# Patient Record
Sex: FEMALE | Race: WHITE | NOT HISPANIC OR LATINO
[De-identification: names, ages, dates, MRNs, and addresses within clinical notes are randomized per-mention and may not be internally consistent; named-entity substitution may affect disease eponyms.]

---

## 2018-06-16 ENCOUNTER — HOSPITAL (OUTPATIENT)
Dept: OTHER | Age: 18
End: 2018-06-16
Attending: EMERGENCY MEDICINE

## 2018-07-27 ENCOUNTER — HOSPITAL (OUTPATIENT)
Dept: OTHER | Age: 18
End: 2018-07-27
Attending: EMERGENCY MEDICINE

## 2018-08-03 ENCOUNTER — HOSPITAL (OUTPATIENT)
Dept: OTHER | Age: 18
End: 2018-08-03
Attending: EMERGENCY MEDICINE

## 2018-10-08 ENCOUNTER — OFFICE VISIT (OUTPATIENT)
Dept: OBGYN CLINIC | Age: 18
End: 2018-10-08

## 2018-10-08 VITALS
DIASTOLIC BLOOD PRESSURE: 65 MMHG | BODY MASS INDEX: 27.29 KG/M2 | HEART RATE: 74 BPM | RESPIRATION RATE: 19 BRPM | HEIGHT: 63 IN | SYSTOLIC BLOOD PRESSURE: 118 MMHG | WEIGHT: 154 LBS

## 2018-10-08 DIAGNOSIS — Z11.3 SCREENING FOR VENEREAL DISEASE: ICD-10-CM

## 2018-10-08 DIAGNOSIS — Z01.419 WELL FEMALE EXAM WITH ROUTINE GYNECOLOGICAL EXAM: Primary | ICD-10-CM

## 2018-10-08 NOTE — PROGRESS NOTES
Stanislav Krishnamurthy is a [de-identified] ,  21 y.o. female [facility] whose Patient's last menstrual period was 10/02/2018 (exact date). was on 10/2/2018 who presents for her annual checkup. She is having no significant problems. She was treated for chlamydia last month and no JUDIE yet. Menstrual status:    Her periods are moderate in flow. She is using three to five pads or tampons per day, usually regular and last 26-30 days. She denies dysmenorrhea. She reports no premenstrual symptoms. Contraception:    The current method of family planning is condoms. She would like to get Nexplanon. She can't remember to take the pill. Sexual history:    She  reports that she currently engages in sexual activity. Medical conditions: This is her first annual exam. She has no significant health history. Pap and Mammogram History:    She has not reached the age for a pap smear. The patient has never had a mammogram.    The patient does not have a family history of breast cancer. Past Medical History:   Diagnosis Date    No known problems      History reviewed. No pertinent surgical history. Allergies: Review of patient's allergies indicates no known allergies. Social History     Social History    Marital status: SINGLE     Spouse name: N/A    Number of children: N/A    Years of education: N/A     Occupational History    Not on file. Social History Main Topics    Smoking status: Never Smoker    Smokeless tobacco: Never Used    Alcohol use No    Drug use: No    Sexual activity: Yes     Other Topics Concern    Not on file     Social History Narrative    No narrative on file     Tobacco History:  reports that she has never smoked. She has never used smokeless tobacco.  Alcohol Abuse:  reports that she does not drink alcohol. Drug Abuse:  reports that she does not use illicit drugs. There is no problem list on file for this patient.       Review of Systems - History obtained from the patient  Constitutional: negative for weight loss, fever, night sweats  HEENT: negative for hearing loss, earache, congestion, snoring, sorethroat  CV: negative for chest pain, palpitations, edema  Resp: negative for cough, shortness of breath, wheezing  GI: negative for change in bowel habits, abdominal pain, black or bloody stools  : negative for frequency, dysuria, hematuria, vaginal discharge  MSK: negative for back pain, joint pain, muscle pain  Breast: negative for breast lumps, nipple discharge, galactorrhea  Skin :negative for itching, rash, hives  Neuro: negative for dizziness, headache, confusion, weakness  Psych: negative for anxiety, depression, change in mood  Heme/lymph: negative for bleeding, bruising, pallor    Physical Exam    Visit Vitals    /65 (BP 1 Location: Left arm, BP Patient Position: Sitting)    Pulse 74    Resp 19    Ht 5' 3\" (1.6 m)    Wt 154 lb (69.9 kg)    LMP 10/02/2018 (Exact Date)    BMI 27.28 kg/m2       Constitutional  · Appearance: well-nourished, well developed, alert, in no acute distress    HENT  · Head and Face: appears normal      Chest  · Respiratory Effort: breathing normal      Skin  · General Inspection: no rash, no lesions identified    Neurologic/Psychiatric  · Mental Status:  · Orientation: grossly oriented to person, place and time  · Mood and Affect: mood normal, affect appropriate    . Assessment:  Routine gynecologic examination  Her current medical status is satisfactory with a recent history or chlamydia  Desires Nexplanon    Plan:  Counseled re: diet, exercise, healthy lifestyle  Return for yearly wellness visits  Gardisil counseling provided  Will check GC and Chlamydia. She was given written info on Nexplanon and will RTO on her menses for placement.

## 2018-10-08 NOTE — MR AVS SNAPSHOT
[facility] [address] [place] [address] [facility] 
[phone] Patient: Kwabena Santoro MRN: [id number] SSM:[birth year] Visit Information Date & Time Provider Department Dept. Phone Encounter #  
 10/8/2018  1:40 PM Lalitha Solares MD [facility] Mackey [phone] [id number] Upcoming Health Maintenance Date Due  
 HPV Age 9Y-34Y (1 of 1 - Female 3 Dose Series) 11/13/2009 Influenza Age 5 to Adult 8/1/2018 Allergies as of 10/8/2018  Review Complete On: 10/8/2018 By: Davide Ji No Known Allergies Current Immunizations  Never Reviewed No immunizations on file. Not reviewed this visit You Were Diagnosed With   
  
 Codes Comments Well female exam with routine gynecological exam    -  Primary ICD-10-CM: W61.223 ICD-9-CM: V72.31 Screening for venereal disease     ICD-10-CM: Z11.3 ICD-9-CM: V74.5 Vitals BP Pulse Resp Height(growth percentile)  
 118/65 (84 %/ 61 %)* (BP 1 Location: Left arm, BP Patient Position: Sitting) 74 19 5' 3\" (1.6 m) (30 %, Z= -0.51) Weight(growth percentile) LMP BMI Smoking Status 154 lb (69.9 kg) (83 %, Z= 0.96) 10/02/2018 (Exact Date) 27.28 kg/m2 (88 %, Z= 1.16) Never Smoker *BP percentiles are based on NHBPEP's 4th Report Growth percentiles are based on CDC 2-20 Years data. BMI and BSA Data Body Mass Index Body Surface Area  
 27.28 kg/m 2 1.76 m 2 Your Updated Medication List  
  
Notice  As of 10/8/2018  2:04 PM  
 You have not been prescribed any medications. Introducing [facility]! [employer] introduces [employer] patient portal. Now you can access parts of your medical record, email your doctor's office, and request medication refills online. 1. In your internet browser, go to [email or web address] 2. Click on the First Time User? Click Here link in the Sign In box.  You will see the New Member Sign Up page. 3. Enter your Daio Access Code exactly as it appears below. You will not need to use this code after youve completed the sign-up process. If you do not sign up before the expiration date, you must request a new code. · Daio Access Code: [email or web address] Expires: 1/6/2019  1:57 PM 
 
4. Enter the last four digits of your Social Security Number (xxxx) and Date of Birth (mm/dd/yyyy) as indicated and click Submit. You will be taken to the next sign-up page. 5. Create a Daio ID. This will be your Daio login ID and cannot be changed, so think of one that is secure and easy to remember. 6. Create a Daio password. You can change your password at any time. 7. Enter your Password Reset Question and Answer. This can be used at a later time if you forget your password. 8. Enter your e-mail address. You will receive e-mail notification when new information is available in [address]. 9. Click Sign Up. You can now view and download portions of your medical record. 10. Click the Download Summary menu link to download a portable copy of your medical information. If you have questions, please visit the Frequently Asked Questions section of the Daio website. Remember, Daio is NOT to be used for urgent needs. For medical emergencies, dial 911. Now available from your iPhone and Android! Please provide this summary of care documentation to your next provider. If you have any questions after today's visit, please call [phone].

## 2018-10-09 LAB
C TRACH RRNA SPEC QL NAA+PROBE: NEGATIVE
N GONORRHOEA RRNA SPEC QL NAA+PROBE: NEGATIVE

## 2018-11-07 ENCOUNTER — OFFICE VISIT (OUTPATIENT)
Dept: OBGYN CLINIC | Age: 18
End: 2018-11-07

## 2018-11-07 DIAGNOSIS — Z30.017 NEXPLANON INSERTION: Primary | ICD-10-CM

## 2018-11-07 DIAGNOSIS — Z32.02 NEGATIVE PREGNANCY TEST: ICD-10-CM

## 2018-11-07 LAB
HCG URINE, QL. (POC): NEGATIVE
VALID INTERNAL CONTROL?: YES

## 2018-11-07 NOTE — PROGRESS NOTES
[facility]  OFFICE PROCEDURE PROGRESS NOTE        Chart reviewed for the following:   Daniel CARPIO, have reviewed the History, Physical and updated the Allergic reactions for [place] performed immediately prior to start of procedure:   Daniel CARPIO, have performed the following reviews on Lelo Domingo prior to the start of the procedure:            * Patient was identified by name and date of birth   * Agreement on procedure being performed was verified  * Risks and Benefits explained to the patient  * Procedure site verified and marked as necessary  * Consent was signed and verified     Time: 3:15pm    Date of procedure: 11/7/2018    Procedure performed by: Markos Carney MD    Provider assisted by: Daniel Garcia MA    Patient assisted by: self    How tolerated by patient: tolerated the procedure well with no complications    Post Procedural Pain Scale: 0 - No Hurt    Comments: none      Lelo Domingo is a [de-identified] ,  21 y.o. female [facility] whose No LMP recorded. was on  presents for office insertion of an [address] sub-dermal contraceptive implant. She has had an opportunity to read the [address] \"Patient Labeling and Consent Form\". We counseled Judahbruce KellyJessica about the insertion and removal procedures as well as potential side-effects, benefits and risks. She state she had no further questions and signed the consent form. She has been using none to the present time. She confirmed that she has no allergies to Betadine or Xylocaine. She reclined on the examination table in the supine position with her left arm flexed at the elbow and externally rotated. The insertion site was identified and marked approximately 6-8 cm proximal to the elbow crease at the inner side of the upper arm overlying the groove between the biceps and triceps muscles. A second tonny was placed 6-8 cm above the first tonny. The insertion site was cleansed with Betadine antiseptic. Approximately 5 ml of 2% xylocaine were injected just under the skin along the planned insertion canal. The NEXPLANON sterile applicator was carefully removed from its blister pack and kept sterile. I removed the needle cap. I visually verified the presence of NEXPLANON inside the needle tip. The skin at the insertion site was then stretched by my thumb and index finger. I then inserted the needle tip through the skin at the appropriate angle to the skin surface, just until the skin has been penetrated. The needle was gently inserted to its full length. The slider was then pushed down and then moved back until it stopped. I then removed the needle and palpated the implant in the appropriate location. The patient also palpated the implant in place. Both Patricia Dominguez and I were able to confirm the presence of the Marina Stagger in its subdermal location by palpation. I placed a small adhesive bandage over the insertion site then wrapped a pressure bandage with sterile gauze. The patient User Card and Patient Chart Label were filled in. She was given the User Card for her records after explaining it to her in detail. I stressed to her that she must have the Marina Stagger removed before three years from today's date. She was then given the Personal Calendar (Bleeding Diary) with instructions in it's use. The patient received Nexplanon lot number [id number].

## 2019-04-17 ENCOUNTER — OFFICE VISIT (OUTPATIENT)
Dept: CARDIOLOGY CLINIC | Age: 19
End: 2019-04-17

## 2019-04-17 VITALS
HEART RATE: 80 BPM | OXYGEN SATURATION: 98 % | BODY MASS INDEX: 26.05 KG/M2 | WEIGHT: 147 LBS | RESPIRATION RATE: 14 BRPM | SYSTOLIC BLOOD PRESSURE: 100 MMHG | DIASTOLIC BLOOD PRESSURE: 70 MMHG | HEIGHT: 63 IN

## 2019-04-17 DIAGNOSIS — R55 SYNCOPE AND COLLAPSE: Primary | ICD-10-CM

## 2019-04-17 DIAGNOSIS — R00.2 PALPITATION: ICD-10-CM

## 2019-04-17 NOTE — PROGRESS NOTES
[place]: Aiyana Knapp  [phone]    History of Present Illness:   Ms. Cesilia Townsend is a 20 yo F referred by Dr. Roberta Santana for cardiac evaluation. She is here due to episodes of syncope. Her last spell happened about a month ago. At the time, she was smoking weed and felt nauseated and passed out. She notes that she has had this happen probably six times in the past.  Usually, she will have about a minute warning and she may feel \"cold sweats\" or shivering followed by \"tunnel vision\". After she passes out, if she gets up right away, she will have further passing out spells. She usually tries to lie down for at least ten minutes and eventually this sensation abates. There are occasions where she feels her heart go fast.  There is rarely an occasion where she may feel slight chest discomfort. She initially thought it might be due to hydration issues and she has been hydrating herself better. She says this is inconsistent and she may go several months without any symptoms. There were no particular triggers with her most recent spell and she says that most of the time they have not been associated with marijuana use. She is compensated on exam with clear lungs and no lower extremity edema. Her blood pressure is 100/70 and heart rate of 80. Her EKG I reviewed personally from her primary care physician from 04/09/2019 and this was normal sinus rhythm, nonspecific ST-T wave abnormality, heart rate of 61. Soc hx. E cigarettes. Fam hx. No early CAD. Assessment and Plan:  Syncope. Unclear etiology. No clear cardiac contribution and will obtain a 24 hour Holter and echocardiogram for further evaluation. We did talk about possible tilt table test and will review her case with Dr. Braeden Neville to see if this might be helpful if her other tests are unrevealing. She  has a past medical history of No known problems. All other systems negative except as above.      PE  Vitals:    04/17/19 1433   BP: 100/70 Pulse: 80   Resp: 14   SpO2: 98%   Weight: 147 lb (66.7 kg)   Height: 5' 3\" (1.6 m)    Body mass index is 26.04 kg/m². General appearance - alert, well appearing, and in no distress  Mental status - affect appropriate to mood  Eyes - sclera anicteric, moist mucous membranes  Neck - supple, no JVD  Chest - clear to auscultation, no wheezes, rales or rhonchi  Heart - normal rate, regular rhythm, normal S1, S2, no murmurs, rubs, clicks or gallops  Abdomen - soft, nontender, nondistended, no masses or organomegaly  Neurological -no focal deficit  Extremities - peripheral pulses normal, no pedal edema      Recent Labs:  No results found for: CHOL, CHOLX, CHLST, CHOLV, [id number], HDL, LDL, LDLC, DLDLP, TGLX, TRIGL, TRIGP, CHHD, CHHDX  No results found for: ABBE, CREAPOC, [facility], CREA, REFC3, REFC4  No results found for: BUN, BUNPOC, IBUN, MBUNV, BUNV  No results found for: K, KI, PLK, WBK  No results found for: HBA1C, HGBE8, VTS6EVXB, MGZ3ZVTL  No results found for: HGBPOC, HGB, HGBP, HGBEXT, HGBEXT  No results found for: PLT, PLTEXT, PLTEXT    Reviewed:  Past Medical History:   Diagnosis Date    No known problems      Social History     Tobacco Use   Smoking Status Never Smoker   Smokeless Tobacco Never Used     Social History     Substance and Sexual Activity   Alcohol Use No     No Known Allergies    Current Outpatient Medications   Medication Sig    etonogestrel (NEXPLANON) 68 mg impl by SubDERmal route. No current facility-administered medications for this visit.         Paris Wright MD  [facility] heart and Vascular [place]  [address] [facility] [address]  [address]

## 2019-04-30 ENCOUNTER — TELEPHONE (OUTPATIENT)
Dept: CARDIOLOGY CLINIC | Age: 19
End: 2019-04-30

## 2019-04-30 NOTE — TELEPHONE ENCOUNTER
2 pt identifiers used  appt scheduled for 5/15/19 @ 8:00am      ----- Message from Namrata Nash MD sent at 4/29/2019  5:12 PM EDT -----  Please call pt today if possible (or first thing tomorrow AM). She wants to reschedule the echo she missed.  thx

## 2019-05-15 ENCOUNTER — TELEPHONE (OUTPATIENT)
Dept: CARDIOLOGY CLINIC | Age: 19
End: 2019-05-15

## 2019-05-15 NOTE — TELEPHONE ENCOUNTER
2 pt identifiers used  Pt. Informed of echo results and verbalized her understanding    ----- Message from Mainor Bose MD sent at 5/15/2019 10:10 AM EDT -----  Please let pt know echo was normal.  Still waiting on holter results.  thx  ----- Message -----  From: Noelle Rosenberg MD  Sent: 5/15/2019  10:08 AM  To: Mainor Bose MD

## 2019-05-20 ENCOUNTER — CLINICAL SUPPORT (OUTPATIENT)
Dept: CARDIOLOGY CLINIC | Age: 19
End: 2019-05-20

## 2019-05-20 DIAGNOSIS — R55 SYNCOPE AND COLLAPSE: ICD-10-CM

## 2019-05-20 DIAGNOSIS — R00.2 PALPITATION: ICD-10-CM

## 2019-05-21 ENCOUNTER — OFFICE VISIT (OUTPATIENT)
Dept: NEUROLOGY | Age: 19
End: 2019-05-21

## 2019-05-21 VITALS
OXYGEN SATURATION: 98 % | RESPIRATION RATE: 18 BRPM | HEART RATE: 76 BPM | SYSTOLIC BLOOD PRESSURE: 120 MMHG | BODY MASS INDEX: 25.33 KG/M2 | WEIGHT: 143 LBS | DIASTOLIC BLOOD PRESSURE: 60 MMHG

## 2019-05-21 DIAGNOSIS — R55 SYNCOPE, UNSPECIFIED SYNCOPE TYPE: Primary | ICD-10-CM

## 2019-05-21 DIAGNOSIS — F32.A DEPRESSION, UNSPECIFIED DEPRESSION TYPE: ICD-10-CM

## 2019-05-21 NOTE — PROGRESS NOTES
[facility] PATIENT EVALUATION/CONSULTATION       PATIENT NAME: Doug Kelley    MRN: [id number]    REASON FOR CONSULTATION: Syncope    05/21/19      Previous records (physician notes, laboratory reports, and radiology reports) and imaging studies were reviewed and summarized. My recommendations will be communicated back to the patient's physician(s) via electronic medical record and/or by [address] [facility] mail. HISTORY OF PRESENT ILLNESS:  Doug Kelley is a 19 y.o. left handed female presenting for evaluation of syncope. Episodes started at age 13 with increasing frequency in 1056-5841. She typically feels \"off\" prior to episodes coming on. She feels slightly nauseated, followed by dizziness/lightheadedeness, blurred vision, SOB, dull chest pain, palpitations, diaphoresis as well as tunneling of vision prior to LOC. Duration of LOC is <1 minute. No associated convulsions or seizure activity, bowel/bladder incontinence, tongue biting. No post event confusion but reports some fatigue. Currently frequency of events is once monthly on average with last episode occurring 2 months ago. Triggers include marijuana use/smoking or fear. No h/o seizure activity, FHx seizures, prior head injuries, CNS infections, febrile seizures. She has seen cardiology with recent holter monitor- results pending. TTE/EKG also completed and normal.         PAST MEDICAL HISTORY:  Past Medical History:   Diagnosis Date    No known problems        PAST SURGICAL HISTORY:  History reviewed. No pertinent surgical history.     FAMILY HISTORY:   Family History   Problem Relation Age of Onset    No Known Problems Mother     No Known Problems Father          SOCIAL HISTORY:  Social History     Socioeconomic History    Marital status: SINGLE     Spouse name: Not on file    Number of children: Not on file    Years of education: Not on file    Highest education level: Not on file   Tobacco Use    Smoking status: Never Smoker    Smokeless tobacco: Never Used   Substance and Sexual Activity    Alcohol use: No    Drug use: No    Sexual activity: Yes   +Marijuana use   +EtOH once monthly on average      MEDICATIONS:   Current Outpatient Medications   Medication Sig Dispense Refill    etonogestrel (NEXPLANON) 68 mg impl by SubDERmal route. ALLERGIES:  No Known Allergies      REVIEW OF SYSTEMS:  10 point ROS reviewed with patient. Please see scanned document under media. PHYSICAL EXAM:  Vital Signs:   Visit Vitals  /60   Pulse 76   Resp 18   Wt 64.9 kg (143 lb)   SpO2 98%   BMI 25.33 kg/m²        General Medical Exam:  General:  Well appearing, comfortable, in no apparent distress. Eyes/ENT: see cranial nerve examination. Neck: No masses appreciated. Full range of motion without tenderness. Respiratory:  Clear to auscultation, good air entry bilaterally. Cardiac:  Regular rate and rhythm, no murmur. GI:  Soft, non-tender, non-distended abdomen. Bowel sounds normal. No masses, organomegaly. Extremities:  No deformities, edema, or skin discoloration. Skin:  No rashes or lesions. Neurological:  · Mental Status:  Alert and oriented to person, place, and time with fluent speech. · Cranial Nerves:   CNII/III/IV/VI: visual fields full to confrontation, EOMI, PERRL, no ptosis or nystagmus. CN V: Facial sensation intact bilaterally, masseter 5/5   CN VII: Facial muscles symmetric and strong   CN VIII: Hears finger rub well bilaterally, intact vestibular function   CN IX/X: Normal palatal movement   CN XI: Full strength shoulder shrug bilaterally   CN XII: Tongue protrusion full and midline without fasciculation or atrophy  · Motor: Normal tone and muscle bulk with no pronator drift.    Individual muscle group testing:  Shoulder abduction:   Left:5/5   Right : 5/5    Shoulder adduction:   Left:5/5   Right : 5/5    Elbow flexion:      Left:5/5   Right : 5/5  Elbow extension:    Left:5/5   Right : 5/5 Wrist flexion:    Left:5/5   Right : 5/5  Wrist extension:    Left:5/5   Right : 5/5  Arm pronation:   Left:5/5   Right : 5/5  Arm supination:   Left:5/5   Right : 5/5    Finger flexion:    Left:5/5   Right : 5/5    Finger extension:   Left:5/5   Right : 5/5   Finger abduction:  Left:5/5   Right : 5/5   Finger adduction:   Left:5/5   Right : 5/5  Hip flexion:     Left:5/5   Right : 5/5         Hip extension:   Left:5/5   Right : 5/5    Knee flexion:    Left:5/5   Right : 5/5    Knee extension:   Left:5/5   Right : 5/5    Dorsiflexion:     Left:5/5   Right : 5/5  Plantar flexion:    Left:5/5   Right : 5/5      · MSRs: No crossed adductors or clonus. RIGHT  LEFT   Brachioradialis 2+ 2+   Biceps 2+ 2+   Triceps 2+ 2+   Knee 2+ 2+   Achilles 2+ 2+        Plantar response Downward Downward          · Sensation: Normal and symmetric perception of pinprick, temperature, light touch, proprioception, and vibration; (-) Romberg. · Coordination: No dysmetria. Normal rapid alternating movements; finger-to-nose and heel-to- shin testing are within normal limits. · Gait: Normal native gait      ASSESSMENT:      ICD-10-CM ICD-9-CM    1. Syncope, unspecified syncope type R55 780.2    2. Depression, unspecified depression type F32.9 36 REFERRAL TO PSYCHIATRY   19year old female presenting with recurrent syncopal episodes since age 13 with recent increasing frequency over the past year. Episodes are typically provoked by fear in the past and/or marijuana use. No accompanying convulsions or post ictal phase suggestive of seizures but will obtain baseline EEG to assess for epileptiform activity. Cardiology evaluations have been unrevealing thus far, although extended cardiac monitoring results are pending. Neurological examination is non-focal today. Description of events sounds most consistent with vasovagal syncope. We discussed avoidance of triggers and cessation of marijuana/substance use.   She requests referral to Psychiatry today to address underlying depression which was provided. Patient was instructed to call for results of her EEG and return for repeat clinical assessment if any new/worsening symptoms. PLAN:  · Baseline EEG- patient to call for results after testing is completed  · Referral to Psychiatry for depression     Follow-up and Dispositions    · Return if symptoms worsen or fail to improve. I have discussed the diagnosis with the patient and the intended plan as seen in the above orders. Patient is in agreement. The patient has received an after-visit summary and questions were answered concerning future plans. Radha Carvalho DO  Staff Neurologist  Diplomate, [facility] [employer] of Psychiatry & Neurology

## 2019-05-22 ENCOUNTER — DOCUMENTATION ONLY (OUTPATIENT)
Dept: NEUROLOGY | Age: 19
End: 2019-05-22

## 2019-05-28 ENCOUNTER — DOCUMENTATION ONLY (OUTPATIENT)
Dept: CARDIOLOGY CLINIC | Age: 19
End: 2019-05-28

## 2019-05-28 NOTE — PROGRESS NOTES
holter shows occasional junctional rhythm and idioventricular rhythm  Future Appointments   Date Time Provider Eladio So   6/5/2019  9:30 AM EEG TECH 2 [facility]

## 2019-05-29 ENCOUNTER — TELEPHONE (OUTPATIENT)
Dept: CARDIOLOGY CLINIC | Age: 19
End: 2019-05-29

## 2019-05-29 NOTE — PROGRESS NOTES
Returned call to patient. Informed patient of test results. Pt verbalized understanding and was scheduled for an appointment with Dr. Ghada Balderas.      Future Appointments   Date Time Provider Eladio Saray   6/5/2019  9:30 AM EEG TECH 2 [address]   7/9/2019  2:40 PM Judith Wallace  [address] [place] [address]

## 2019-06-05 ENCOUNTER — APPOINTMENT (OUTPATIENT)
Dept: NEUROLOGY | Age: 19
End: 2019-06-05
Attending: PSYCHIATRY & NEUROLOGY

## 2019-06-24 ENCOUNTER — TELEPHONE (OUTPATIENT)
Dept: NEUROLOGY | Age: 19
End: 2019-06-24

## 2019-06-24 NOTE — TELEPHONE ENCOUNTER
* Message from Caron below:        ----- Message from Sabrina Choi sent at 6/24/2019 12:31 PM EDT -----  Regarding: Dr Stanley De La Rosa is calling to see what time her Sleep Study Test is for tomorrow 6-, and what she needs to do, please call pt at [phone]

## 2019-06-25 ENCOUNTER — HOSPITAL ENCOUNTER (OUTPATIENT)
Dept: NEUROLOGY | Age: 19
Discharge: HOME | End: 2019-06-25
Attending: PSYCHIATRY & NEUROLOGY
Payer: OTHER GOVERNMENT

## 2019-06-25 DIAGNOSIS — R55 SYNCOPE, UNSPECIFIED SYNCOPE TYPE: ICD-10-CM

## 2019-06-25 PROCEDURE — 95819 EEG AWAKE AND ASLEEP: CPT

## 2019-06-27 ENCOUNTER — HOSPITAL ENCOUNTER (EMERGENCY)
Age: 19
Discharge: HOME | End: 2019-06-28
Attending: EMERGENCY MEDICINE
Payer: OTHER GOVERNMENT

## 2019-06-27 DIAGNOSIS — B00.9 HERPES INFECTION: Primary | ICD-10-CM

## 2019-06-27 PROCEDURE — 99284 EMERGENCY DEPT VISIT MOD MDM: CPT

## 2019-06-27 NOTE — LETTER
[address] 
[address] [facility] DEPT 
[address] [place], [place] [address] [id number] 
[phone] Work/School Note Date: 6/27/2019 To Whom It May concern: 
 
Wanda Alegre was seen and treated today in the emergency room by the following Dr. Dylon Andrew. Wanda Alegre Sincerely, 
 
 
 
 
Rosalinda Pearce RN

## 2019-06-28 VITALS
TEMPERATURE: 98.7 F | RESPIRATION RATE: 18 BRPM | BODY MASS INDEX: 25.19 KG/M2 | WEIGHT: 142.2 LBS | DIASTOLIC BLOOD PRESSURE: 68 MMHG | SYSTOLIC BLOOD PRESSURE: 108 MMHG | HEART RATE: 82 BPM | OXYGEN SATURATION: 98 %

## 2019-06-28 LAB — HCG UR QL: NEGATIVE

## 2019-06-28 PROCEDURE — 81025 URINE PREGNANCY TEST: CPT

## 2019-06-28 PROCEDURE — 74011000250 HC RX REV CODE- 250: Performed by: EMERGENCY MEDICINE

## 2019-06-28 PROCEDURE — 87529 HSV DNA AMP PROBE: CPT

## 2019-06-28 PROCEDURE — 74011250637 HC RX REV CODE- 250/637: Performed by: EMERGENCY MEDICINE

## 2019-06-28 RX ORDER — IBUPROFEN 600 MG/1
600 TABLET ORAL
Status: COMPLETED | OUTPATIENT
Start: 2019-06-28 | End: 2019-06-28

## 2019-06-28 RX ORDER — LIDOCAINE HYDROCHLORIDE 20 MG/ML
JELLY TOPICAL
Status: DISCONTINUED | OUTPATIENT
Start: 2019-06-28 | End: 2019-06-28

## 2019-06-28 RX ORDER — ACYCLOVIR 800 MG/1
800 TABLET ORAL
Status: COMPLETED | OUTPATIENT
Start: 2019-06-28 | End: 2019-06-28

## 2019-06-28 RX ORDER — LIDOCAINE HYDROCHLORIDE 20 MG/ML
SOLUTION OROPHARYNGEAL
Status: DISCONTINUED | OUTPATIENT
Start: 2019-06-28 | End: 2019-06-28

## 2019-06-28 RX ORDER — LIDOCAINE HYDROCHLORIDE 20 MG/ML
JELLY TOPICAL
Status: COMPLETED | OUTPATIENT
Start: 2019-06-28 | End: 2019-06-28

## 2019-06-28 RX ORDER — LIDOCAINE HYDROCHLORIDE 20 MG/ML
JELLY TOPICAL
Qty: 100 ML | Refills: 0 | Status: ON HOLD | OUTPATIENT
Start: 2019-06-28 | End: 2019-08-09

## 2019-06-28 RX ORDER — ACYCLOVIR 800 MG/1
800 TABLET ORAL
Qty: 35 TAB | Refills: 0 | Status: SHIPPED | OUTPATIENT
Start: 2019-06-28 | End: 2019-07-01

## 2019-06-28 RX ADMIN — IBUPROFEN 600 MG: 600 TABLET, FILM COATED ORAL at 00:45

## 2019-06-28 RX ADMIN — LIDOCAINE HYDROCHLORIDE: 20 JELLY TOPICAL at 00:46

## 2019-06-28 RX ADMIN — ACYCLOVIR 800 MG: 800 TABLET ORAL at 00:58

## 2019-06-28 NOTE — DISCHARGE INSTRUCTIONS
Patient Education        We sent off a test to see if this is primary HSV1( oral herpes) or HSV2( genital herpes) . This will come back in 3-4 days and help you understand the risk of recurrence. Genital Herpes in Teens: Care Instructions  Your Care Instructions  Genital herpes is caused by a virus called herpes simplex. There are two types of this virus. Type 2 is the type that usually causes genital herpes. But type 1 can also cause it. Type 1 is the type that causes cold sores. Genital herpes is a sexually transmitted infection (STI). The most common way to get it is through sexual or other contact with someone who has herpes. For example, the virus can spread from a sore in the genital area to the lips. And it can spread from a cold sore on the lips to the genital area. Some people are surprised to find out that they have herpes or that they gave it to someone else. This is because a lot of people who have it don't know that they have it. They may not get sores or they may have sores that they cannot see. But the virus can still be spread by a person who does not have obvious sores or symptoms. There is no cure for herpes, but antiviral medicine can help you feel better and help prevent more outbreaks. This medicine may also lower the chance of spreading the virus. Finding out that you have genital herpes can cause a wide range of emotions. You may feel angry or upset. You may also feel bad about yourself or about sex. Counseling or a support group may help you feel better. And as you get older, you may not get as many outbreaks. The sores may also heal faster and not hurt as much. Follow-up care is a key part of your treatment and safety. Be sure to make and go to all appointments, and call your doctor if you are having problems. It's also a good idea to know your test results and keep a list of the medicines you take. How can you care for yourself at home? · Be safe with medicines.  If your doctor prescribes medicine, take it exactly as prescribed. Call your doctor if you think you are having a problem with your medicine. You will get more details on the specific medicines your doctor prescribes. · To reduce the pain and itching from herpes sores:  ? Wash the area with water 3 or 4 times a day. ? Keep the sores clean and dry in between baths or showers. You may want to let the sores air dry. This may feel better than a towel. ? Wear cotton underwear. Cotton absorbs moisture well. ? Take an over-the-counter pain medicine, such as acetaminophen (Tylenol), ibuprofen (Advil, Motrin), or naproxen (Aleve). Read and follow all instructions on the label. Do not take two or more pain medicines at the same time unless the doctor told you to. Many pain medicines have acetaminophen, which is Tylenol. Too much acetaminophen (Tylenol) can be harmful. To prevent the spread of genital herpes  · Latex condoms are a good way to reduce the risk of spreading the virus. But you can still get the virus even if you use a condom. For the best protection, use condoms every time you have sex, from the beginning to the end of sexual contact. Remember that you can infect someone even if you do not have obvious symptoms or sores. · Avoid sexual contact when you have symptoms. Symptoms include sores, tingling, or pain. · Wash your hands if you touch a sore. In some cases, especially if this is your first herpes outbreak, you can spread the virus to other parts of your body or to other people. · Having one sex partner (who does not have STIs and does not have sex with anyone else) is a good way to avoid STIs. Not having sex is the best way to prevent any STI. · Talk to your sex partner or partners about genital herpes. · Encourage any sex partners to get a blood test to see if they have been infected. To protect yourself  · You should never feel pressured to have sex. It's okay to say \"no\" anytime you want to stop.   · It's important to feel safe with your sex partner and with the activities you are doing together. If you don't feel safe, talk with an adult you trust.  When should you call for help? Call your doctor now or seek immediate medical care if:    · You have a new fever.     · You have increasing redness, swelling, or red streaks around herpes sores.    Watch closely for changes in your health, and be sure to contact your doctor if:    · You have herpes and you think you might be pregnant.     · You have an outbreak of herpes sores, and the sores are not healing.     · You have frequent outbreaks of genital herpes sores.     · You are unable to pass urine or are constipated.     · You want to start antiviral medicine.     · You do not get better as expected. Where can you learn more? Go to http://narayan-skye.info/. Miley Hollis in the search box to learn more about \"Genital Herpes in Teens: Care Instructions. \"  Current as of: September 11, 2018  Content Version: 11.9  © 7034-6856 [employer]. Care instructions adapted under license by [employer] (which disclaims liability or warranty for this information). If you have questions about a medical condition or this instruction, always ask your healthcare professional. [address] any warranty or liability for your use of this information.

## 2019-06-28 NOTE — ED PROVIDER NOTES
HPI     18 yo here for eval of vaginal pain. Started 3 dys ago- went to OSH, and was dx with BV. Had speculum exam and other swabs were done as well. Has been getitng worse and not can't handle the pain. Hurts to walk.   + sexually active. No hx of cold sores. Past Medical History:   Diagnosis Date    No known problems        History reviewed. No pertinent surgical history. Family History:   Problem Relation Age of Onset    No Known Problems Mother     No Known Problems Father     Parkinsonism Paternal Grandmother        Social History     Socioeconomic History    Marital status: SINGLE     Spouse name: Not on file    Number of children: Not on file    Years of education: Not on file    Highest education level: Not on file   Occupational History    Not on file   Social Needs    Financial resource strain: Not on file    Food insecurity:     Worry: Not on file     Inability: Not on file    Transportation needs:     Medical: Not on file     Non-medical: Not on file   Tobacco Use    Smoking status: Never Smoker    Smokeless tobacco: Never Used   Substance and Sexual Activity    Alcohol use: No    Drug use: No    Sexual activity: Yes   Lifestyle    Physical activity:     Days per week: Not on file     Minutes per session: Not on file    Stress: Not on file   Relationships    Social connections:     Talks on phone: Not on file     Gets together: Not on file     Attends Yazidi service: Not on file     Active member of club or organization: Not on file     Attends meetings of clubs or organizations: Not on file     Relationship status: Not on file    Intimate partner violence:     Fear of current or ex partner: Not on file     Emotionally abused: Not on file     Physically abused: Not on file     Forced sexual activity: Not on file   Other Topics Concern    Not on file   Social History Narrative    Not on file         ALLERGIES: Patient has no known allergies.     Review of Systems Genitourinary: Positive for dyspareunia, dysuria, genital sores and vaginal discharge. All other systems reviewed and are negative. Vitals:    06/27/19 2210 06/27/19 2213   BP:  108/68   Pulse:  92   Resp:  18   Temp:  98.7 °F (37.1 °C)   SpO2:  100%   Weight: 64.5 kg (142 lb 3.2 oz)             Physical Exam   Constitutional: She is oriented to person, place, and time. She appears well-developed. She appears distressed. HENT:   Mouth/Throat: Oropharynx is clear and moist.   Eyes: Pupils are equal, round, and reactive to light. Cardiovascular: Normal rate and regular rhythm. Pulmonary/Chest: Effort normal and breath sounds normal.   Abdominal: Soft. Genitourinary:   Genitourinary Comments: Multiple labial and vaginal ulcers  With surrounding erythema, edema   Neurological: She is alert and oriented to person, place, and time. Skin: Skin is warm. Nursing note and vitals reviewed. MDM  Number of Diagnoses or Management Options  Herpes infection: new and requires workup  Diagnosis management comments: Exam c/w primary HSV- swab done and started on acyclovir and topical lidocaine.         Amount and/or Complexity of Data Reviewed  Clinical lab tests: ordered and reviewed  Obtain history from someone other than the patient: yes    Risk of Complications, Morbidity, and/or Mortality  Presenting problems: moderate  Management options: moderate    Patient Progress  Patient progress: improved         Procedures
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
## 2019-06-28 NOTE — ED NOTES
Patient education given on urojet application and the patient expresses understanding and acceptance of instructions.

## 2019-06-28 NOTE — ED NOTES
Patient given discharge paperwork and verbalizes understanding of discharge instructions. No distress noted on discharge. No complaints voiced. Patient verbalizes understanding of prescriptions given.

## 2019-06-28 NOTE — ED NOTES
Patient refusing to urinate at this time d/t pain. Patient crying reporting that her vaginal pain/burning is the worst she's ever felt and she needs relief. This RN made sure that patient's concerns were heard.  Will notify Dr. Chi Boyle when she comes out of a procedure

## 2019-07-01 LAB
HSV1 DNA SPEC QL NAA+PROBE: POSITIVE
HSV2 DNA SPEC QL NAA+PROBE: NEGATIVE
SPECIMEN SOURCE: ABNORMAL

## 2019-07-01 RX ORDER — ACYCLOVIR 800 MG/1
800 TABLET ORAL
Qty: 35 TAB | Refills: 0 | Status: SHIPPED | OUTPATIENT
Start: 2019-07-01 | End: 2019-07-08

## 2019-07-09 ENCOUNTER — OFFICE VISIT (OUTPATIENT)
Dept: CARDIOLOGY CLINIC | Age: 19
End: 2019-07-09

## 2019-07-09 VITALS
RESPIRATION RATE: 16 BRPM | HEIGHT: 63 IN | OXYGEN SATURATION: 99 % | SYSTOLIC BLOOD PRESSURE: 110 MMHG | HEART RATE: 70 BPM | WEIGHT: 143 LBS | BODY MASS INDEX: 25.34 KG/M2 | DIASTOLIC BLOOD PRESSURE: 58 MMHG

## 2019-07-09 DIAGNOSIS — R55 SYNCOPE AND COLLAPSE: Primary | ICD-10-CM

## 2019-07-09 DIAGNOSIS — I95.9 HYPOTENSION, UNSPECIFIED HYPOTENSION TYPE: ICD-10-CM

## 2019-07-09 DIAGNOSIS — I49.8 JUNCTIONAL RHYTHM: ICD-10-CM

## 2019-07-09 RX ORDER — ACYCLOVIR 800 MG/1
800 TABLET ORAL 2 TIMES DAILY
COMMUNITY

## 2019-07-09 NOTE — PROGRESS NOTES
Cardiac Electrophysiology OFFICE Consultation Note     Subjective:      Sharmaine Galarza is a 19 y.o. patient who is seen for evaluation of dizziness and syncope  She had first syncope when she was 8   She thought it was vasovagal type  She then had 6 more when she was first year in college  Usually with standing  No family hx of syncope  Dr Wally Goldman referred  Echo was concluded normal  holter shows occasional junctional rhythm and idioventricular rhythm    Echo 5/2019 normal LVEF    Current Outpatient Medications   Medication Sig Dispense Refill    acyclovir (ZOVIRAX) 800 mg tablet Take 800 mg by mouth two (2) times a day.  lidocaine (URO-JET/ GLYDO) 2 % jelp jelly Pleas apply 5mL to area no more frequently than every 4 hours. 100 mL 0    etonogestrel (NEXPLANON) 68 mg impl by SubDERmal route. No Known Allergies  Past Medical History:   Diagnosis Date    No known problems    No surgical history. Family History   Problem Relation Age of Onset    No Known Problems Mother     No Known Problems Father     Parkinsonism Paternal Grandmother      Social History     Tobacco Use    Smoking status: Never Smoker    Smokeless tobacco: Never Used   Substance Use Topics    Alcohol use: No        Review of Systems:   Constitutional: Negative for fever, chills, weight loss, malaise/fatigue. HEENT: Negative for nosebleeds, vision changes. Respiratory: Negative for cough, hemoptysis  Cardiovascular: Negative for chest pain, palpitations, orthopnea, claudication, leg swelling, and PND. Gastrointestinal: Negative for nausea, vomiting, diarrhea, blood in stool and melena. Genitourinary: Negative for dysuria, and hematuria. Musculoskeletal: Negative for myalgias, arthralgia. Skin: Negative for rash. Heme: Does not bleed or bruise easily.    Neurological: Negative for speech change and focal weakness     Objective:     Visit Vitals  /58 (BP 1 Location: Left arm, BP Patient Position: Sitting)   Pulse 70   Resp 16   Ht 5' 3\" (1.6 m)   Wt 143 lb (64.9 kg)   LMP 06/13/2019 (Approximate)   SpO2 99%   BMI 25.33 kg/m²      Physical Exam:   Constitutional: well-developed and well-nourished. No respiratory distress. Head: Normocephalic and atraumatic. Eyes: Pupils are equal, round  ENT: hearing normal  Neck: supple. No JVD present. Cardiovascular: Normal rate, regular rhythm. Exam reveals no gallop and no friction rub. No murmur heard. Pulmonary/Chest: Effort normal and breath sounds normal. No wheezes. Abdominal: Soft, no tenderness. Musculoskeletal: no edema. Neurological: alert,oriented. Skin: Skin is warm and dry  Psychiatric: normal mood and affect. Behavior is normal. Judgment and thought content normal.        Assessment/Plan:       ICD-10-CM ICD-9-CM    1. Syncope and collapse R55 780.2    2. Junctional rhythm I49.8 427.89    3. Hypotension, unspecified hypotension type I95.9 458.9      reviewed diet, exercise and weight control   She cannot tell BP or HR or both caused her syncope  I think it is a mixed response neurally mediated syncope  She agrees with tilt table test    Thank you for involving me in this patient's care and please call with further concerns or questions. Catracho Kaur M.D.   Electrophysiology/Cardiology  [facility]  [address] [place] [address]                             [phone]

## 2019-07-09 NOTE — PATIENT INSTRUCTIONS
Your Tilt Table procedure has been scheduled for 8/9/2019 at 9:30 am, at [facility]. 
 
Please report to Admitting Department by 7:30 am, or 2 hours prior to your scheduled procedure. Please bring a list of your current medications and medication bottles, if able, to the hospital on this day. You will be unable to drive after your procedure so please make sure to bring someone with you to your procedure. You will need to have nothing to eat or drink after midnight, the night prior to your procedure. You may have small sips of water, if needed, to take with your medication. You will need labs drawn prior to your procedure. Please go to [facility] to have this done no sooner than 7/9/2019 and no later than 8/2/2019. You should not stop your medication. After your procedure, you will need to follow up with Dr. Angeles Maldonado.  Your follow-up appointment has been scheduled for 9/5/2019 at 3:00 pm.

## 2019-07-11 NOTE — PROCEDURES
PROCEDURE: ROUTINE INPATIENT EEG  NAME:   Bessie Deluca  ACCOUNT NUMBER : [de-identified]  MRN:   [id number]  DATE OF SERVICE: 6/25/19    HISTORY/INDICATION:  Pt is a 22yo female with episodes of syncopy starting at age 13. Increasing in frequency in 2018-19. Feels slightly nauseated, followed by dizziness/lighytheadedness, blurred vision, SOB, dull chest pain, palpitations, diaphoresis as well as tunnelling of vision prior to LOC lasting less than one minute. No seizure activity, bowel / bladder incontinence, tongue biting, or post-syncopal confusion. Sleep deprived EEG is performed to assess for evidence of underlying epilepsy as an etiology. MEDICATIONS:   Current Outpatient Medications   Medication Sig Dispense Refill    acyclovir (ZOVIRAX) 800 mg tablet Take 800 mg by mouth two (2) times a day.  lidocaine (URO-JET/ GLYDO) 2 % jelp jelly Pleas apply 5mL to area no more frequently than every 4 hours. 100 mL 0    etonogestrel (NEXPLANON) 68 mg impl by SubDERmal route. CONDITIONS OF RECORDING: This is a sleep deprived 21-channel EEG recording performed in accordance with the international 10-20 system with one channel devoted to limited EKG. This study was done during states of wakefulness and . Photic stimulation and hyperventilation were performed as activating procedures. DESCRIPTION:   Upon maximal arousal the posterior dominant rhythm has a frequency of 11Hz with an amplitude of 20uV. This activity is symmetric over the bilateral posterior derivations and attenuates with eye opening. Photic stimulation and hyperventilation do not significantly alter the tracing. Normal sleep architecture is seen with stage II sleep recognized by the presence of symmetric vertex waves and sleep spindles. There are no focal abnormalities, epileptiform discharges, or electrographic seizures seen.      INTERPRETATION: Normal awake and asleep, sleep deprived EEG    CLINICAL CORRELATION: A normal EEG does not definitively exclude a diagnosis of epilepsy if clinical suspicion is high consider more prolonged EEG monitoring.      Merrill Sheriff MD

## 2019-07-23 LAB
BASOPHILS # BLD AUTO: 0 X10E3/UL (ref 0–0.2)
BASOPHILS NFR BLD AUTO: 0 %
BUN SERPL-MCNC: 11 MG/DL (ref 6–20)
BUN/CREAT SERPL: 16 (ref 9–23)
CALCIUM SERPL-MCNC: 9.5 MG/DL (ref 8.7–10.2)
CHLORIDE SERPL-SCNC: 103 MMOL/L (ref 96–106)
CO2 SERPL-SCNC: 23 MMOL/L (ref 20–29)
CREAT SERPL-MCNC: 0.68 MG/DL (ref 0.57–1)
EOSINOPHIL # BLD AUTO: 0.2 X10E3/UL (ref 0–0.4)
EOSINOPHIL NFR BLD AUTO: 2 %
ERYTHROCYTE [DISTWIDTH] IN BLOOD BY AUTOMATED COUNT: 13.3 % (ref 12.3–15.4)
GLUCOSE SERPL-MCNC: 80 MG/DL (ref 65–99)
HCT VFR BLD AUTO: 41.6 % (ref 34–46.6)
HGB BLD-MCNC: 14 G/DL (ref 11.1–15.9)
IMM GRANULOCYTES # BLD AUTO: 0 X10E3/UL (ref 0–0.1)
IMM GRANULOCYTES NFR BLD AUTO: 0 %
LYMPHOCYTES # BLD AUTO: 3.3 X10E3/UL (ref 0.7–3.1)
LYMPHOCYTES NFR BLD AUTO: 41 %
MCH RBC QN AUTO: 30.7 PG (ref 26.6–33)
MCHC RBC AUTO-ENTMCNC: 33.7 G/DL (ref 31.5–35.7)
MCV RBC AUTO: 91 FL (ref 79–97)
MONOCYTES # BLD AUTO: 0.5 X10E3/UL (ref 0.1–0.9)
MONOCYTES NFR BLD AUTO: 6 %
NEUTROPHILS # BLD AUTO: 4 X10E3/UL (ref 1.4–7)
NEUTROPHILS NFR BLD AUTO: 51 %
PLATELET # BLD AUTO: 290 X10E3/UL (ref 150–450)
POTASSIUM SERPL-SCNC: 4.3 MMOL/L (ref 3.5–5.2)
RBC # BLD AUTO: 4.56 X10E6/UL (ref 3.77–5.28)
SODIUM SERPL-SCNC: 141 MMOL/L (ref 134–144)
WBC # BLD AUTO: 8 X10E3/UL (ref 3.4–10.8)

## 2019-08-02 RX ORDER — SODIUM CHLORIDE 0.9 % (FLUSH) 0.9 %
5-40 SYRINGE (ML) INJECTION AS NEEDED
Status: CANCELLED | OUTPATIENT
Start: 2019-08-02

## 2019-08-02 RX ORDER — SODIUM CHLORIDE 0.9 % (FLUSH) 0.9 %
5-40 SYRINGE (ML) INJECTION EVERY 8 HOURS
Status: CANCELLED | OUTPATIENT
Start: 2019-08-02

## 2019-08-09 ENCOUNTER — HOSPITAL ENCOUNTER (OUTPATIENT)
Dept: CARDIAC CATH/INVASIVE PROCEDURES | Age: 19
Discharge: HOME | End: 2019-08-09
Attending: INTERNAL MEDICINE | Admitting: INTERNAL MEDICINE
Payer: OTHER GOVERNMENT

## 2019-08-09 ENCOUNTER — TELEPHONE (OUTPATIENT)
Dept: CARDIOLOGY CLINIC | Age: 19
End: 2019-08-09

## 2019-08-09 VITALS
HEIGHT: 63 IN | BODY MASS INDEX: 24.8 KG/M2 | HEART RATE: 60 BPM | SYSTOLIC BLOOD PRESSURE: 105 MMHG | WEIGHT: 140 LBS | DIASTOLIC BLOOD PRESSURE: 50 MMHG | OXYGEN SATURATION: 100 % | RESPIRATION RATE: 16 BRPM

## 2019-08-09 DIAGNOSIS — R55 SYNCOPE AND COLLAPSE: ICD-10-CM

## 2019-08-09 PROCEDURE — 74011250637 HC RX REV CODE- 250/637: Performed by: INTERNAL MEDICINE

## 2019-08-09 PROCEDURE — 93660 TILT TABLE EVALUATION: CPT | Performed by: INTERNAL MEDICINE

## 2019-08-09 RX ORDER — SODIUM CHLORIDE 0.9 % (FLUSH) 0.9 %
5-40 SYRINGE (ML) INJECTION EVERY 8 HOURS
Status: DISCONTINUED | OUTPATIENT
Start: 2019-08-09 | End: 2019-08-09 | Stop reason: HOSPADM

## 2019-08-09 RX ORDER — SODIUM CHLORIDE 0.9 % (FLUSH) 0.9 %
5-40 SYRINGE (ML) INJECTION AS NEEDED
Status: DISCONTINUED | OUTPATIENT
Start: 2019-08-09 | End: 2019-08-09 | Stop reason: HOSPADM

## 2019-08-09 RX ORDER — NITROGLYCERIN 0.4 MG/1
0.4 TABLET SUBLINGUAL AS NEEDED
Status: DISCONTINUED | OUTPATIENT
Start: 2019-08-09 | End: 2019-08-09 | Stop reason: HOSPADM

## 2019-08-09 RX ADMIN — NITROGLYCERIN 0.4 MG: 0.4 TABLET, ORALLY DISINTEGRATING SUBLINGUAL at 11:13

## 2019-08-09 NOTE — PROGRESS NOTES
Transfer to [address] [place] [address] from Procedure Area    Verbal report given to Rio Martell RN on Adwoa Alvarenga being transferred to Cardiac Cath Lab  for routine post - op   Patient is post Tilt Table Study procedure. Patient stable upon transfer to . Report consisted of patients Situation, Background, Assessment and   Recommendations(SBAR). Information from the following report(s) Procedure Summary, Intake/Output, MAR and Cardiac Rhythm Sinus Bradycardia was reviewed with the receiving nurse. Opportunity for questions and clarification was provided. Patient medicated during procedure with orders obtained and verified by Dr. Nichole Gerber. Refer to patient PROCEDURE REPORT for vital signs, assessment, status, and response during procedure.

## 2019-08-09 NOTE — ROUTINE PROCESS
Cardiac Cath Lab Recovery Arrival Note: 
 
 
Juan Pablo Linares arrived to Cardiac Cath Lab, Recovery Area. Staff introduced to patient. Patient identifiers verified with NAME and DATE OF BIRTH. Procedure verified with patient. Consent forms reviewed and signed by patient or authorized representative and verified. Allergies verified. Patient and family oriented to department. Patient and family informed of procedure and plan of care. Questions answered with review. Patient prepped for procedure, per orders from physician, prior to arrival. 
 
Patient on cardiac monitor, non-invasive blood pressure, SPO2 monitor. On RA. Patient is A&Ox 4. Patient reports no pain. Patient in stretcher, in low position, with side rails up, call bell within reach, patient instructed to call if assistance as needed. Patient prep in: [address], [place] [address]. Patient family has pager # Family in: . Prep by: ZOHREH Arriaga RN

## 2019-08-09 NOTE — DISCHARGE INSTRUCTIONS
Salt and water 64 oz a day  Compression stockings if standing long at work or school  Future Appointments   Date Time Provider Eladio Saray   9/3/2019  3:00 PM Celine Gillis  [address]

## 2019-08-09 NOTE — H&P
Cardiac Electrophysiology H/P Note     Subjective:      Brenna Gold is a 19 y.o. patient who is seen for evaluation of syncope  She had been seen for evaluation of dizziness and syncope in office 1 month ago  She had first syncope when she was 8   She thought it was vasovagal type  She then had 6 more when she was first year in college  She goes to [employer] here  Usually with standing  No family hx of syncope  Dr Isaura Lombardi referred  Echo was concluded normal  holter shows occasional junctional rhythm and idioventricular rhythm     Echo 5/2019 normal LVEF    Patient Active Problem List   Diagnosis Code    Syncope and collapse R55     Current Facility-Administered Medications   Medication Dose Route Frequency Provider Last Rate Last Dose    sodium chloride (NS) flush 5-40 mL  5-40 mL IntraVENous Q8H Sujata Lincoln MD        sodium chloride (NS) flush 5-40 mL  5-40 mL IntraVENous PRN Sujata Lincoln MD         No Known Allergies  Past Medical History:   Diagnosis Date    No known problems      History reviewed. No pertinent surgical history. Family History   Problem Relation Age of Onset    No Known Problems Mother     No Known Problems Father     Parkinsonism Paternal Grandmother      Social History     Tobacco Use    Smoking status: Never Smoker    Smokeless tobacco: Never Used   Substance Use Topics    Alcohol use: No        Review of Systems:   Constitutional: Negative for fever, chills, weight loss, malaise/fatigue. HEENT: Negative for nosebleeds, vision changes. Respiratory: Negative for cough, hemoptysis  Cardiovascular: Negative for chest pain, palpitations, orthopnea, claudication, leg swelling, + syncope, and no PND. Gastrointestinal: Negative for nausea, vomiting, diarrhea, blood in stool and melena. Genitourinary: Negative for dysuria, and hematuria. Musculoskeletal: Negative for myalgias, arthralgia. Skin: Negative for rash. Heme: Does not bleed or bruise easily.    Neurological: Negative for speech change and focal weakness     Objective:     Visit Vitals  /53 (BP 1 Location: Left arm, BP Patient Position: At rest)   Pulse (!) 59   Resp 16   Ht 5' 3\" (1.6 m)   Wt 140 lb (63.5 kg)   LMP 06/13/2019 (Approximate)   SpO2 100%   Breastfeeding? No   BMI 24.80 kg/m²      Physical Exam:   Constitutional: well-developed and well-nourished. No respiratory distress. Head: Normocephalic and atraumatic. Eyes: Pupils are equal, round  ENT: hearing normal  Neck: supple. No JVD present. Cardiovascular: Normal rate, regular rhythm. Exam reveals no gallop and no friction rub. No murmur heard. Pulmonary/Chest: Effort normal and breath sounds normal. No wheezes. Abdominal: Soft, no tenderness. Musculoskeletal: no edema. Neurological: alert,oriented. Skin: Skin is warm and dry  Psychiatric: normal mood and affect. Behavior is normal. Judgment and thought content normal.         Assessment/Plan:   Recurrent syncope  Occasional bradycardia     I have discussed with her about tilt table test because the way she passed out with standing and intermittent bradycardia at her age is likely neurally mediated syncope  She agrees to proceed     Thank you for involving me in this patient's care and please call with further concerns or questions. Corby Baugh M.D.   Electrophysiology/Cardiology  [facility] [place]  [address] [place] [address]                             [phone]

## 2019-08-09 NOTE — TELEPHONE ENCOUNTER
Patient scheduled for Tilt Table Test at 9:30 am. Patient has not arrived yet for scheduled procedure. Attempted to reach patient by telephone. A message was left for return call.

## 2019-08-09 NOTE — PROGRESS NOTES
Cardiac Cath Lab Procedure Area Arrival Note:    Rj Huang arrived to Cardiac Cath Lab, Procedure Area. Patient identifiers verified with NAME and DATE OF BIRTH. Procedure verified with patient. Consent forms verified. Allergies verified. Patient informed of procedure and plan of care. Questions answered with review. Patient voiced understanding of procedure and plan of care. Patient on cardiac monitor, non-invasive blood pressure, SPO2 monitor. On room air   IV of NS on pump at 10 ml/hr. Patient status doing well without problems. Patient is A&Ox 4. Patient reports no pain. Patient medicated during procedure with orders obtained and verified by Dr. Shaila Jacques. Refer to patients Cardiac Cath Lab PROCEDURE REPORT for vital signs, assessment, status, and response during procedure, printed at end of case. Printed report on chart or scanned into chart.

## 2019-08-09 NOTE — PROGRESS NOTES
TRANSFER - IN REPORT:    Verbal report received from [facility] on Cynthia Colindres  being received from cath lab procedure area  for routine progression of care. Report consisted of patients Situation, Background, Assessment and Recommendations(SBAR). Information from the following report(s) Kardex and Procedure Summary was reviewed with the receiving clinician. Opportunity for questions and clarification was provided. Assessment completed upon patients arrival to [address] [place] [address] and care assumed. Cardiac Cath Lab Recovery Arrival Note:    Cynthia Colindres arrived to [facility] recovery area. Patient procedure= Tilt Table Study. Patient on cardiac monitor, non-invasive blood pressure, SPO2 monitor. On RA . IV  of NS on pump at 25 ml/hr. Patient status doing well without problems. Patient is A&Ox 4. Patient reports no pain. PROCEDURE SITE CHECK:    Procedure site:   None      No change in patient status. Continue to monitor patient and status.

## 2019-08-09 NOTE — PROCEDURES
Cardiac Procedure Note   Patient: Cynthia Colindres  MRN: [id number]  SSN: [id number]   YOB: 2000 Age: 19 y.o.   Sex: female    Date of Procedure: 8/9/2019   Pre-procedure Diagnosis: upright syncope, junctional rhythm on holter  Post-procedure Diagnosis: probably vasovagal syncope type  Procedure: Tilt Table Study  :  Dr. Moses Cohn MD    Assistant(s):  None  Anesthesia: none  Estimated Blood Loss: none  Specimens Removed: None  Findings: no syncope  HR dropped to sinus bradycardia 48 bpm and mild hypotension with feeling cold chills but no syncope  Complications: None   Implants:  None  Signed by:  Moses Cohn MD  8/9/2019  11:26 AM

## 2019-08-10 NOTE — PROCEDURES
DATE of PROCEDURE: 8/9/2019    Head-up tilt-table test with and without sublingual nitroglycerin     HISTORY:  This is a 19 y. o.woman with a history of recurrent syncope. Holter reported occasional junctional rhythm. She may have neurally mediated syncope with vagal response. The patient was explained the risks and benefits of the head-up tilt-table test and she agreed to proceed. PROCEDURE IN DETAIL:  After informed written consent was obtained, the patient was brought to the Electrophysiology Suite where she was laid on the tilt table. The patient was strapped down for her safety and she had the baseline blood pressure of 104/61, heart rate of 59 beats per minute. The patient was then tilted up to 70 degrees. During the baseline tilt test, she was asymptomatic and there was no major change in heart rate or blood pressure. Nitroglycerin 0.4 mg sublingually was given and her heart rate increased to 107 bpm then decreased to 48 bpm while blood pressure was 005-432 mmHg systolically, and she felt cold chills without syncope. Her heart rate fluctuated and was not persistently bradycardic. At the end, her blood pressure was 121/65 and heart rate was 74 beats per minute. Specimen removed: NONE  Complications: none  ASSESSMENT AND PLAN:  The patient had a bradycardic response after a challenge of one sublingual nitroglycerin. She did not have reproducible syncope during this tilt table test but felt prodrome similar to her prior syncope.   She will return for further follow up

## 2019-09-03 ENCOUNTER — TELEPHONE (OUTPATIENT)
Dept: CARDIOLOGY CLINIC | Age: 19
End: 2019-09-03

## 2019-09-03 NOTE — TELEPHONE ENCOUNTER
Patient does not want to come in for f/u if not necessary. Would like get results of tilt table test over the phone.      Phone: [phone]

## 2019-09-05 NOTE — TELEPHONE ENCOUNTER
Verified patient with two types of identifiers. Notified patient of tilt table results. Patient states that she has not had any episodes since test. Notified patient that per Dr. Asif Barkley if she had been symptomatic we could possibly start on new medication/compression stockings. Patient states that she would like to continue to monitor for now since she had not had any issues and will call if symptoms come back. Patient verbalized understanding and will call with any other questions.

## 2020-01-11 ENCOUNTER — HOSPITAL ENCOUNTER (EMERGENCY)
Age: 20
Discharge: HOME | End: 2020-01-11
Attending: EMERGENCY MEDICINE
Payer: SELF-PAY

## 2020-01-11 VITALS
TEMPERATURE: 98.7 F | SYSTOLIC BLOOD PRESSURE: 119 MMHG | OXYGEN SATURATION: 97 % | DIASTOLIC BLOOD PRESSURE: 81 MMHG | HEART RATE: 97 BPM | RESPIRATION RATE: 18 BRPM

## 2020-01-11 DIAGNOSIS — Z76.0 MEDICATION REFILL: Primary | ICD-10-CM

## 2020-01-11 PROCEDURE — 99282 EMERGENCY DEPT VISIT SF MDM: CPT

## 2020-01-11 RX ORDER — VALACYCLOVIR HYDROCHLORIDE 1 G/1
500 TABLET, FILM COATED ORAL DAILY
Qty: 30 TAB | Refills: 0 | Status: SHIPPED | OUTPATIENT
Start: 2020-01-11 | End: 2020-02-10

## 2020-01-11 NOTE — ED TRIAGE NOTES
Arrived from home want to discuss about medications related to herpes. Patient was seen here around July and placed on acyclovir. Does not have PCP.

## 2020-01-11 NOTE — ED PROVIDER NOTES
Patient is a 47-year-old female who presents today requesting prophylactic treatment for HSV. 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 Has not had a further outbreak. That was her first outbreak. Confirmation by viral culture. She is starting a new relationship and is requesting medication to help prevent shedding. Currently has no acute outbreak or issues.                 Past Medical History:   Diagnosis Date    No known problems        Past Surgical History:   Procedure Laterality Date    TILT TABLE EVAL W/WO DRUG  8/9/2019              Family History:   Problem Relation Age of Onset    No Known Problems Mother     No Known Problems Father     Parkinsonism Paternal Grandmother        Social History     Socioeconomic History    Marital status: SINGLE     Spouse name: Not on file    Number of children: Not on file    Years of education: Not on file    Highest education level: Not on file   Occupational History    Not on file   Social Needs    Financial resource strain: Not on file    Food insecurity:     Worry: Not on file     Inability: Not on file    Transportation needs:     Medical: Not on file     Non-medical: Not on file   Tobacco Use    Smoking status: Never Smoker    Smokeless tobacco: Never Used   Substance and Sexual Activity    Alcohol use: No    Drug use: No    Sexual activity: Yes   Lifestyle    Physical activity:     Days per week: Not on file     Minutes per session: Not on file    Stress: Not on file   Relationships    Social connections:     Talks on phone: Not on file     Gets together: Not on file     Attends Buddhism service: Not on file     Active member of club or organization: Not on file     Attends meetings of clubs or organizations: Not on file     Relationship status: Not on file    Intimate partner violence:     Fear of current or ex partner: Not on file     Emotionally abused: Not on file     Physically abused: Not on file     Forced sexual activity: Not on file   Other Topics Concern    Not on file   Social History Narrative    Not on file         ALLERGIES: Patient has no known allergies. Review of Systems   Constitutional: Negative for chills and fever. Genitourinary: Negative for genital sores. All other systems reviewed and are negative. Vitals:    01/11/20 1330   BP: 119/81   Pulse: 97   Resp: 18   Temp: 98.7 °F (37.1 °C)   SpO2: 97%            Physical Exam  Vitals signs and nursing note reviewed. Constitutional:       Appearance: Normal appearance. Eyes:      Conjunctiva/sclera: Conjunctivae normal.   Pulmonary:      Effort: Pulmonary effort is normal.   Musculoskeletal: Normal range of motion. Neurological:      Mental Status: She is alert and oriented to person, place, and time. Psychiatric:         Mood and Affect: Mood normal.         Behavior: Behavior normal.          MDM  Number of Diagnoses or Management Options  Medication refill:   Diagnosis management comments: Patient is currently asymptomatic. She has had 1 outbreak. We discussed the pros and cons of putting her on medication. Up-to-date does state you can go on valacyclovir 500 mg daily. I discussed that this is something that could be managed long-term by OB/GYN. And she can have a discussion on long-term medication for HSV. We will try to get an appointment, will give her valacyclovir 500 mg once a day for prophylactic treatment of HSV. She will follow-up for further management and discuss her options.          Procedures